# Patient Record
(demographics unavailable — no encounter records)

---

## 2024-11-08 NOTE — HISTORY OF PRESENT ILLNESS
[FreeTextEntry1] : 18 yo presents to establish care.   PMHx: none PSHx: none OBHx: none GYNHx: sexually active and uses condoms FamHx: paternal grandmother breast CA x2 Dx at 48 SocHx :negative Medications: negative Allergies: NKDA

## 2024-11-08 NOTE — HISTORY OF PRESENT ILLNESS
[FreeTextEntry1] : 16 yo presents to establish care.   PMHx: none PSHx: none OBHx: none GYNHx: sexually active and uses condoms FamHx: paternal grandmother breast CA x2 Dx at 48 SocHx :negative Medications: negative Allergies: NKDA

## 2024-11-08 NOTE — END OF VISIT
[FreeTextEntry3] :    I, Monica Mcgill, acted as a scribe on behalf of Dr. Angelique Estevez M.D., on 10/30/2024.           All medical entries made by the scribe were at my, Dr. Angelique Estevez M.D., direction and personally dictated by me on 10/30/2024. I have reviewed the chart and agree that the record accurately reflects my personal performance of the history, physical exam, assessment and plan. I have also personally directed, reviewed, and agreed with the chart.

## 2024-11-08 NOTE — PLAN
[FreeTextEntry1] : 18 yo presents to establish care  Plan: - Discussed safe sex practices - Pt declines OCPs  RTO in one year

## 2024-11-08 NOTE — PLAN
[FreeTextEntry1] : 16 yo presents to establish care  Plan: - Discussed safe sex practices - Pt declines OCPs  RTO in one year